# Patient Record
Sex: MALE | Race: WHITE | NOT HISPANIC OR LATINO | Employment: FULL TIME | URBAN - METROPOLITAN AREA
[De-identification: names, ages, dates, MRNs, and addresses within clinical notes are randomized per-mention and may not be internally consistent; named-entity substitution may affect disease eponyms.]

---

## 2023-10-16 ENCOUNTER — OFFICE VISIT (OUTPATIENT)
Age: 38
End: 2023-10-16
Payer: COMMERCIAL

## 2023-10-16 VITALS
DIASTOLIC BLOOD PRESSURE: 86 MMHG | HEART RATE: 63 BPM | BODY MASS INDEX: 33.7 KG/M2 | OXYGEN SATURATION: 99 % | HEIGHT: 70 IN | WEIGHT: 235.4 LBS | SYSTOLIC BLOOD PRESSURE: 136 MMHG

## 2023-10-16 DIAGNOSIS — Z00.00 HEALTHCARE MAINTENANCE: ICD-10-CM

## 2023-10-16 DIAGNOSIS — N41.1 CHRONIC PROSTATITIS: ICD-10-CM

## 2023-10-16 DIAGNOSIS — I10 ESSENTIAL HYPERTENSION: Primary | ICD-10-CM

## 2023-10-16 DIAGNOSIS — R35.89 POLYURIA: ICD-10-CM

## 2023-10-16 LAB
BILIRUB BLD-MCNC: NEGATIVE MG/DL
CLARITY, POC: CLEAR
COLOR UR: YELLOW
EXPIRATION DATE: NORMAL
GLUCOSE UR STRIP-MCNC: NEGATIVE MG/DL
KETONES UR QL: NEGATIVE
LEUKOCYTE EST, POC: NEGATIVE
Lab: NORMAL
NITRITE UR-MCNC: NEGATIVE MG/ML
PH UR: 6 [PH] (ref 5–8)
PROT UR STRIP-MCNC: NEGATIVE MG/DL
RBC # UR STRIP: NEGATIVE /UL
SP GR UR: 1 (ref 1–1.03)
UROBILINOGEN UR QL: NORMAL

## 2023-10-16 PROCEDURE — 81003 URINALYSIS AUTO W/O SCOPE: CPT | Performed by: STUDENT IN AN ORGANIZED HEALTH CARE EDUCATION/TRAINING PROGRAM

## 2023-10-16 PROCEDURE — 99204 OFFICE O/P NEW MOD 45 MIN: CPT | Performed by: STUDENT IN AN ORGANIZED HEALTH CARE EDUCATION/TRAINING PROGRAM

## 2023-10-16 RX ORDER — LEVOFLOXACIN 500 MG/1
500 TABLET, FILM COATED ORAL DAILY
Qty: 28 TABLET | Refills: 0 | Status: SHIPPED | OUTPATIENT
Start: 2023-10-16 | End: 2023-10-16 | Stop reason: SDUPTHER

## 2023-10-16 RX ORDER — LEVOFLOXACIN 500 MG/1
500 TABLET, FILM COATED ORAL DAILY
Qty: 28 TABLET | Refills: 0 | Status: SHIPPED | OUTPATIENT
Start: 2023-10-16 | End: 2023-11-13

## 2023-10-16 RX ORDER — TELMISARTAN 20 MG/1
20 TABLET ORAL DAILY
Qty: 90 TABLET | Refills: 3 | Status: SHIPPED | OUTPATIENT
Start: 2023-10-16

## 2023-10-16 NOTE — PROGRESS NOTES
"    Office Note     Name: Jay Burns    : 1985     MRN: [id number]     Chief Complaint  Urinary Frequency (Unable to full empty bladder. ) and Breathing Problem (\"Not breathing properly through nose\" )    Subjective     History of Present Illness:  Jay Burns is a 38 y.o. male who presents today for initial visit to establish care for chronic conditions and preventative health care.  Patient reports chronic urinary frequency, incomplete emptying, and difficulty emptying.  Denies any other infectious symptoms or burning.  Difficult to tell if this is polyuria or lower urinary tract symptoms, as he does note he has to drink plenty of water to keep up with his urine output.  He was reportedly diagnosed with chronic prostatitis by urologist a couple of years ago when he had similar symptoms, and these improved on an antibiotic.  He otherwise reports checking his blood pressure at home and having an elevated blood pressure in the 140s to 160s systolic.    Past Medical History: History reviewed. No pertinent past medical history.    Past Surgical History:   Past Surgical History:   Procedure Laterality Date    VASECTOMY  2018       Immunizations:   Immunization History   Administered Date(s) Administered    COVID-19 (PFIZER) Purple Cap Monovalent 2021, 2021        Medications:     Current Outpatient Medications:     levoFLOXacin (Levaquin) 500 MG tablet, Take 1 tablet by mouth Daily for 28 days., Disp: 28 tablet, Rfl: 0    telmisartan (MICARDIS) 20 MG tablet, Take 1 tablet by mouth Daily., Disp: 90 tablet, Rfl: 3    Allergies:   No Known Allergies    Family History: History reviewed. No pertinent family history.    Social History:   Social History     Socioeconomic History    Marital status:    Tobacco Use    Smoking status: Never     Passive exposure: Never    Smokeless tobacco: Never    Tobacco comments:     Never Used   Vaping Use    Vaping Use: Never used   Substance and " "Sexual Activity    Alcohol use: Never    Drug use: Never    Sexual activity: Yes     Partners: Female     Birth control/protection: Vasectomy         Objective     Vital Signs  /86   Pulse 63   Ht 177.8 cm (70\")   Wt 107 kg (235 lb 6.4 oz)   SpO2 99%   BMI 33.78 kg/m²   Estimated body mass index is 33.78 kg/m² as calculated from the following:    Height as of this encounter: 177.8 cm (70\").    Weight as of this encounter: 107 kg (235 lb 6.4 oz).    BMI is >= 30 and <35. (Class 1 Obesity). The following options were offered after discussion;: Not appropriate for this patient as his BMI is falsely elevated due to muscle mass      Physical Exam  Constitutional:       General: He is not in acute distress.     Appearance: He is not toxic-appearing.   Cardiovascular:      Rate and Rhythm: Normal rate and regular rhythm.      Heart sounds: No murmur heard.     No friction rub. No gallop.   Pulmonary:      Effort: Pulmonary effort is normal.      Breath sounds: Normal breath sounds.   Abdominal:      General: Abdomen is flat. There is no distension.   Skin:     General: Skin is warm and dry.   Neurological:      Mental Status: He is alert.   Psychiatric:         Mood and Affect: Mood normal.         Behavior: Behavior normal.          Assessment and Plan     1. Chronic prostatitis  Chronic, uncontrolled  -UA in office negative today, however patient was previously diagnosed by urologist with chronic prostatitis and the symptoms are very similar to that presentation  -We will empirically treat for prostatitis while awaiting urology follow-up, check PSA and refer to urology  - PSA DIAGNOSTIC; Future  - Ambulatory Referral to Urology  - levoFLOXacin (Levaquin) 500 MG tablet; Take 1 tablet by mouth Daily for 28 days.  Dispense: 28 tablet; Refill: 0    2. Polyuria  We will check A1c in case this represents polyuria from hyperglycemia  - Hemoglobin A1c; Future  - POCT urinalysis dipstick, automated    3. Healthcare " maintenance  Not fully addressed at this visit, no indication for early screening  - Comprehensive Metabolic Panel; Future  - Lipid Panel; Future    4. Essential hypertension  Blood pressure 136/86 today, consistently above 140 systolic at home  -Will start telmisartan and check labs in 2 weeks  - Comprehensive Metabolic Panel; Future  - Lipid Panel; Future  - Hemoglobin A1c; Future  - telmisartan (MICARDIS) 20 MG tablet; Take 1 tablet by mouth Daily.  Dispense: 90 tablet; Refill: 3        Counseling was given to patient for the following topics: instructions for management.    Follow Up  Return in about 6 weeks (around 11/27/2023).    [facility]  [address] [address] [phone]

## 2023-10-16 NOTE — PATIENT INSTRUCTIONS
Try nasal saline spray 2 times daily  Try flonase (fluticasone) nasal spray per package instructions during the winter time  Bloodwork in two weeks

## 2023-10-23 ENCOUNTER — LAB (OUTPATIENT)
Age: 38
End: 2023-10-23
Payer: COMMERCIAL

## 2023-10-23 DIAGNOSIS — Z00.00 ROUTINE GENERAL MEDICAL EXAMINATION AT A HEALTH CARE FACILITY: Primary | ICD-10-CM

## 2023-10-23 DIAGNOSIS — N41.1 CHRONIC PROSTATITIS: ICD-10-CM

## 2023-10-23 DIAGNOSIS — I10 ESSENTIAL HYPERTENSION: ICD-10-CM

## 2023-10-23 DIAGNOSIS — R35.89 POLYURIA: ICD-10-CM

## 2023-10-23 DIAGNOSIS — Z00.00 HEALTHCARE MAINTENANCE: ICD-10-CM

## 2023-10-23 DIAGNOSIS — I10 ESSENTIAL HYPERTENSION, MALIGNANT: ICD-10-CM

## 2023-10-23 PROCEDURE — 84153 ASSAY OF PSA TOTAL: CPT | Performed by: STUDENT IN AN ORGANIZED HEALTH CARE EDUCATION/TRAINING PROGRAM

## 2023-10-23 PROCEDURE — 80053 COMPREHEN METABOLIC PANEL: CPT | Performed by: STUDENT IN AN ORGANIZED HEALTH CARE EDUCATION/TRAINING PROGRAM

## 2023-10-23 PROCEDURE — 80061 LIPID PANEL: CPT | Performed by: STUDENT IN AN ORGANIZED HEALTH CARE EDUCATION/TRAINING PROGRAM

## 2023-10-23 PROCEDURE — 83036 HEMOGLOBIN GLYCOSYLATED A1C: CPT | Performed by: STUDENT IN AN ORGANIZED HEALTH CARE EDUCATION/TRAINING PROGRAM

## 2023-10-24 LAB
ALBUMIN SERPL-MCNC: 4.9 G/DL (ref 3.5–5.2)
ALBUMIN/GLOB SERPL: 1.9 G/DL
ALP SERPL-CCNC: 68 U/L (ref 39–117)
ALT SERPL W P-5'-P-CCNC: 28 U/L (ref 1–41)
ANION GAP SERPL CALCULATED.3IONS-SCNC: 7.1 MMOL/L (ref 5–15)
AST SERPL-CCNC: 22 U/L (ref 1–40)
BILIRUB SERPL-MCNC: 0.7 MG/DL (ref 0–1.2)
BUN SERPL-MCNC: 18 MG/DL (ref 6–20)
BUN/CREAT SERPL: 14.3 (ref 7–25)
CALCIUM SPEC-SCNC: 9.1 MG/DL (ref 8.6–10.5)
CHLORIDE SERPL-SCNC: 104 MMOL/L (ref 98–107)
CHOLEST SERPL-MCNC: 170 MG/DL (ref 0–200)
CO2 SERPL-SCNC: 26.9 MMOL/L (ref 22–29)
CREAT SERPL-MCNC: 1.26 MG/DL (ref 0.76–1.27)
EGFRCR SERPLBLD CKD-EPI 2021: 75.8 ML/MIN/1.73
GLOBULIN UR ELPH-MCNC: 2.6 GM/DL
GLUCOSE SERPL-MCNC: 88 MG/DL (ref 65–99)
HBA1C MFR BLD: 4.9 % (ref 4.8–5.6)
HDLC SERPL-MCNC: 37 MG/DL (ref 40–60)
LDLC SERPL CALC-MCNC: 121 MG/DL (ref 0–100)
LDLC/HDLC SERPL: 3.27 {RATIO}
POTASSIUM SERPL-SCNC: 4.5 MMOL/L (ref 3.5–5.2)
PROT SERPL-MCNC: 7.5 G/DL (ref 6–8.5)
PSA SERPL-MCNC: 0.57 NG/ML (ref 0–4)
SODIUM SERPL-SCNC: 138 MMOL/L (ref 136–145)
TRIGL SERPL-MCNC: 60 MG/DL (ref 0–150)
VLDLC SERPL-MCNC: 12 MG/DL (ref 5–40)

## 2023-10-25 ENCOUNTER — PATIENT MESSAGE (OUTPATIENT)
Age: 38
End: 2023-10-25
Payer: COMMERCIAL

## 2023-10-25 DIAGNOSIS — N13.9 LOWER URINARY OBSTRUCTIVE SYMPTOM: Primary | ICD-10-CM

## 2023-10-26 ENCOUNTER — TELEPHONE (OUTPATIENT)
Age: 38
End: 2023-10-26
Payer: COMMERCIAL

## 2023-10-26 NOTE — TELEPHONE ENCOUNTER
Patient was returning call but wasn't sure who was calling. He is able to urinate no problem but immediately feels like he could go ahead after he has gone. It is a constant feeling all day. He is working until 5 so he can't answer a call but is able to talk over [employer] message.

## 2023-10-31 ENCOUNTER — TELEPHONE (OUTPATIENT)
Age: 38
End: 2023-10-31
Payer: COMMERCIAL

## 2023-10-31 NOTE — TELEPHONE ENCOUNTER
PT CALLED IN CHECKING STATUS OF HIS REFERRAL. HUB SEES THAT ITS PENDING, PT THOUGHT IT WAS STAT. HE WOULD LIKE A CALL BACK.

## 2023-10-31 NOTE — TELEPHONE ENCOUNTER
Called patient and provided number for Central Scheduling to schedule. Advised it was placed as routine, not stat.

## 2023-11-15 ENCOUNTER — HOSPITAL ENCOUNTER (OUTPATIENT)
Dept: ULTRASOUND IMAGING | Facility: HOSPITAL | Age: 38
Discharge: HOME | End: 2023-11-15
Admitting: STUDENT IN AN ORGANIZED HEALTH CARE EDUCATION/TRAINING PROGRAM
Payer: COMMERCIAL

## 2023-11-15 DIAGNOSIS — N13.9 LOWER URINARY OBSTRUCTIVE SYMPTOM: ICD-10-CM

## 2023-11-15 PROCEDURE — 76775 US EXAM ABDO BACK WALL LIM: CPT

## 2023-11-27 ENCOUNTER — OFFICE VISIT (OUTPATIENT)
Age: 38
End: 2023-11-27
Payer: COMMERCIAL

## 2023-11-27 ENCOUNTER — LAB (OUTPATIENT)
Age: 38
End: 2023-11-27
Payer: COMMERCIAL

## 2023-11-27 VITALS
SYSTOLIC BLOOD PRESSURE: 136 MMHG | HEART RATE: 69 BPM | BODY MASS INDEX: 34.58 KG/M2 | WEIGHT: 241 LBS | DIASTOLIC BLOOD PRESSURE: 78 MMHG | OXYGEN SATURATION: 100 % | TEMPERATURE: 98.7 F

## 2023-11-27 DIAGNOSIS — I10 ESSENTIAL HYPERTENSION: Primary | ICD-10-CM

## 2023-11-27 DIAGNOSIS — N13.9 LOWER URINARY OBSTRUCTIVE SYMPTOM: ICD-10-CM

## 2023-11-27 DIAGNOSIS — I10 ESSENTIAL HYPERTENSION: ICD-10-CM

## 2023-11-27 PROCEDURE — 80048 BASIC METABOLIC PNL TOTAL CA: CPT | Performed by: STUDENT IN AN ORGANIZED HEALTH CARE EDUCATION/TRAINING PROGRAM

## 2023-11-27 PROCEDURE — 99214 OFFICE O/P EST MOD 30 MIN: CPT | Performed by: STUDENT IN AN ORGANIZED HEALTH CARE EDUCATION/TRAINING PROGRAM

## 2023-11-27 NOTE — PROGRESS NOTES
Office Note     Name: Jay Burns    : 1985     MRN: [id number]     Chief Complaint  Hypertension and Polyuria    Subjective     History of Present Illness:  Jay Burns is a 38 y.o. male who presents today for follow up of hypertension and urinary frequency. Antibiotic course was only minimally helpful and he has not yet started flomax.  He has been checking his bp at home which has consistently been in the 120's systolic.      Past Medical History: History reviewed. No pertinent past medical history.    Past Surgical History:   Past Surgical History:   Procedure Laterality Date    VASECTOMY  2018       Immunizations:   Immunization History   Administered Date(s) Administered    COVID-19 (PFIZER) Purple Cap Monovalent 2021, 2021    Hep B, Adolescent or Pediatric 1998    Hepatitis B Adolescent High Risk Infant 1998, 1998    MMR 1998    Td (TDVAX) 2003        Medications:     Current Outpatient Medications:     telmisartan (MICARDIS) 20 MG tablet, Take 1 tablet by mouth Daily., Disp: 90 tablet, Rfl: 3    tamsulosin (FLOMAX) 0.4 MG capsule 24 hr capsule, Take 1 capsule by mouth Daily. (Patient not taking: Reported on 2023), Disp: 30 capsule, Rfl: 5    Allergies:   No Known Allergies    Family History: History reviewed. No pertinent family history.    Social History:   Social History     Socioeconomic History    Marital status:    Tobacco Use    Smoking status: Never     Passive exposure: Never    Smokeless tobacco: Never    Tobacco comments:     Never Used   Vaping Use    Vaping Use: Never used   Substance and Sexual Activity    Alcohol use: Never    Drug use: Never    Sexual activity: Yes     Partners: Female     Birth control/protection: Vasectomy         Objective     Vital Signs  /78   Pulse 69   Temp 98.7 °F (37.1 °C)   Wt 109 kg (241 lb)   SpO2 100%   BMI 34.58 kg/m²   Estimated body mass index is 34.58 kg/m² as  "calculated from the following:    Height as of 10/16/23: 177.8 cm (70\").    Weight as of this encounter: 109 kg (241 lb).            Physical Exam  Constitutional:       General: He is not in acute distress.     Appearance: He is not toxic-appearing.   Pulmonary:      Effort: Pulmonary effort is normal. No respiratory distress.   Abdominal:      General: Abdomen is flat. There is no distension.   Skin:     General: Skin is warm and dry.   Neurological:      Mental Status: He is alert.   Psychiatric:         Mood and Affect: Mood normal.         Behavior: Behavior normal.          Assessment and Plan     1. Essential hypertension  Chronic stable  -BP is at goal at home on current dose of telmisartan, continue at current dose and check BMP  - Basic metabolic panel; Future    2. Lower urinary obstructive symptom  Chronic, uncontrolled  -Hasn't started flomax yet, counseled to try this  -Counseled to call and make his urology appt if not improving on flomax  -Antibiotics did not significantly improve these symptoms       Counseling was given to patient for the following topics: instructions for management.    Follow Up  Return in about 6 months (around 5/27/2024) for Annual physical.    Jose De Jesus Cuellar MD  [facility]  [address] [address] [phone]  "

## 2023-11-28 LAB
ANION GAP SERPL CALCULATED.3IONS-SCNC: 12.2 MMOL/L (ref 5–15)
BUN SERPL-MCNC: 13 MG/DL (ref 6–20)
BUN/CREAT SERPL: 12.4 (ref 7–25)
CALCIUM SPEC-SCNC: 9.6 MG/DL (ref 8.6–10.5)
CHLORIDE SERPL-SCNC: 100 MMOL/L (ref 98–107)
CO2 SERPL-SCNC: 25.8 MMOL/L (ref 22–29)
CREAT SERPL-MCNC: 1.05 MG/DL (ref 0.76–1.27)
EGFRCR SERPLBLD CKD-EPI 2021: 94.3 ML/MIN/1.73
GLUCOSE SERPL-MCNC: 88 MG/DL (ref 65–99)
POTASSIUM SERPL-SCNC: 4.2 MMOL/L (ref 3.5–5.2)
SODIUM SERPL-SCNC: 138 MMOL/L (ref 136–145)

## 2023-12-05 ENCOUNTER — OFFICE VISIT (OUTPATIENT)
Age: 38
End: 2023-12-05
Payer: COMMERCIAL

## 2023-12-05 VITALS
SYSTOLIC BLOOD PRESSURE: 122 MMHG | BODY MASS INDEX: 33.79 KG/M2 | DIASTOLIC BLOOD PRESSURE: 78 MMHG | OXYGEN SATURATION: 100 % | WEIGHT: 236 LBS | HEIGHT: 70 IN | TEMPERATURE: 98.9 F | HEART RATE: 96 BPM

## 2023-12-05 DIAGNOSIS — R52 BODY ACHES: ICD-10-CM

## 2023-12-05 DIAGNOSIS — U07.1 COVID-19 VIRUS INFECTION: Primary | ICD-10-CM

## 2023-12-05 DIAGNOSIS — R09.81 NASAL CONGESTION: ICD-10-CM

## 2023-12-05 LAB
EXPIRATION DATE: ABNORMAL
FLUAV AG UPPER RESP QL IA.RAPID: NOT DETECTED
FLUBV AG UPPER RESP QL IA.RAPID: NOT DETECTED
INTERNAL CONTROL: ABNORMAL
Lab: ABNORMAL
SARS-COV-2 AG UPPER RESP QL IA.RAPID: DETECTED

## 2023-12-05 PROCEDURE — 99213 OFFICE O/P EST LOW 20 MIN: CPT | Performed by: STUDENT IN AN ORGANIZED HEALTH CARE EDUCATION/TRAINING PROGRAM

## 2023-12-05 PROCEDURE — 87428 SARSCOV & INF VIR A&B AG IA: CPT | Performed by: STUDENT IN AN ORGANIZED HEALTH CARE EDUCATION/TRAINING PROGRAM

## 2023-12-05 RX ORDER — BENZONATATE 200 MG/1
200 CAPSULE ORAL 3 TIMES DAILY PRN
Qty: 30 CAPSULE | Refills: 0 | Status: SHIPPED | OUTPATIENT
Start: 2023-12-05 | End: 2023-12-15

## 2023-12-05 RX ORDER — GUAIFENESIN 600 MG/1
1200 TABLET, EXTENDED RELEASE ORAL 2 TIMES DAILY
Qty: 30 TABLET | Refills: 0 | Status: SHIPPED | OUTPATIENT
Start: 2023-12-05 | End: 2023-12-15

## 2023-12-05 NOTE — LETTER
December 5, 2023     Patient: Jay Burns   YOB: 1985   Date of Visit: 12/5/2023       To Whom It May Concern:    It is my medical opinion that Jay Burns has COVID-19 and should miss work from 12/4-12/8/2023 and return with a mask through 12/13/2023.         Sincerely,        Cisco Cuellar MD    CC: No Recipients

## 2023-12-05 NOTE — PROGRESS NOTES
Office Note     Name: Jay Burns    : 1985     MRN: [id number]     Chief Complaint  Nasal Congestion (Pt states this started yesterday. ), Shortness of Breath, and Generalized Body Aches    Subjective     History of Present Illness:  Jay Burns is a 38 y.o. male who presents today for acute visit for congestion, muscle aches, fever.  His wife had COVID recently and his symptoms began on .  He is not dyspneic but is congested    Past Medical History: History reviewed. No pertinent past medical history.    Past Surgical History:   Past Surgical History:   Procedure Laterality Date    VASECTOMY  2018       Immunizations:   Immunization History   Administered Date(s) Administered    COVID-19 (PFIZER) Purple Cap Monovalent 2021, 2021    Hep B, Adolescent or Pediatric 1998    Hepatitis B Adolescent High Risk Infant 1998, 1998    MMR 1998    Td (TDVAX) 2003        Medications:     Current Outpatient Medications:     telmisartan (MICARDIS) 20 MG tablet, Take 1 tablet by mouth Daily., Disp: 90 tablet, Rfl: 3    benzonatate (TESSALON) 200 MG capsule, Take 1 capsule by mouth 3 (Three) Times a Day As Needed for Cough for up to 10 days., Disp: 30 capsule, Rfl: 0    guaiFENesin (MUCINEX) 600 MG 12 hr tablet, Take 2 tablets by mouth 2 (Two) Times a Day for 10 days., Disp: 30 tablet, Rfl: 0    Allergies:   No Known Allergies    Family History: History reviewed. No pertinent family history.    Social History:   Social History     Socioeconomic History    Marital status:    Tobacco Use    Smoking status: Never     Passive exposure: Never    Smokeless tobacco: Never    Tobacco comments:     Never Used   Vaping Use    Vaping Use: Never used   Substance and Sexual Activity    Alcohol use: Never    Drug use: Never    Sexual activity: Yes     Partners: Female     Birth control/protection: Vasectomy         Objective     Vital Signs  /78    "Pulse 96   Temp 98.9 °F (37.2 °C)   Ht 177.8 cm (70\")   Wt 107 kg (236 lb)   SpO2 100%   BMI 33.86 kg/m²   Estimated body mass index is 33.86 kg/m² as calculated from the following:    Height as of this encounter: 177.8 cm (70\").    Weight as of this encounter: 107 kg (236 lb).            Physical Exam  Constitutional:       General: He is not in acute distress.     Appearance: He is not toxic-appearing.   Pulmonary:      Effort: Pulmonary effort is normal. No respiratory distress.   Abdominal:      General: Abdomen is flat. There is no distension.   Skin:     General: Skin is warm and dry.   Neurological:      Mental Status: He is alert.   Psychiatric:         Mood and Affect: Mood normal.         Behavior: Behavior normal.          Assessment and Plan     1. Body aches  See problem #3  - POCT SARS-CoV-2 + Flu Antigen ROSMERY    2. Nasal congestion  See problem #3  - POCT SARS-CoV-2 + Flu Antigen ROSMERY    3. COVID-19 virus infection  In office COVID test positive  -Patient has high blood pressure so we will prescribe Tessalon Perles and Mucinex  -Discussed Paxlovid with him, he declines this at this time  -Counseled him on return precautions for dyspnea especially if associated with a low oxygen saturation.  Counseled him on isolation for 5 days with mask isolation for 5 days following that  - guaiFENesin (MUCINEX) 600 MG 12 hr tablet; Take 2 tablets by mouth 2 (Two) Times a Day for 10 days.  Dispense: 30 tablet; Refill: 0  - benzonatate (TESSALON) 200 MG capsule; Take 1 capsule by mouth 3 (Three) Times a Day As Needed for Cough for up to 10 days.  Dispense: 30 capsule; Refill: 0       Counseling was given to patient for the following topics: instructions for management.    Follow Up  No follow-ups on file.    [facility]  [address] [address]  [phone]  "

## 2024-05-31 ENCOUNTER — OFFICE VISIT (OUTPATIENT)
Age: 39
End: 2024-05-31

## 2024-05-31 VITALS
OXYGEN SATURATION: 97 % | SYSTOLIC BLOOD PRESSURE: 126 MMHG | TEMPERATURE: 97.5 F | DIASTOLIC BLOOD PRESSURE: 78 MMHG | WEIGHT: 236 LBS | BODY MASS INDEX: 33.79 KG/M2 | HEIGHT: 70 IN | HEART RATE: 79 BPM

## 2024-05-31 DIAGNOSIS — I10 ESSENTIAL HYPERTENSION: ICD-10-CM

## 2024-05-31 DIAGNOSIS — N40.1 BENIGN PROSTATIC HYPERPLASIA WITH LOWER URINARY TRACT SYMPTOMS, SYMPTOM DETAILS UNSPECIFIED: ICD-10-CM

## 2024-05-31 DIAGNOSIS — Z00.00 HEALTHCARE MAINTENANCE: Primary | ICD-10-CM

## 2024-05-31 RX ORDER — FINASTERIDE 5 MG/1
5 TABLET, FILM COATED ORAL DAILY
Qty: 90 TABLET | Refills: 3 | Status: SHIPPED | OUTPATIENT
Start: 2024-05-31

## 2024-05-31 NOTE — PROGRESS NOTES
Office Note     Name: Jay Burns    : 1985     MRN: [id number]     Chief Complaint  Hypertension and Annual Exam    Subjective     History of Present Illness:  Jay Burns is a 38 y.o. male who presents today for annual health maintenance examination.  He has no acute complaints today, although his tamsulosin has had a bothersome side effect of nasal congestion.  His blood pressures at home have been in the 120s over 70s on a regular basis.  He is attempting to lose some weight right now and has lost around 10 pounds per his report.  He did notice that the tamsulosin has helped with his lower urinary tract symptoms.  He reports taking testosterone for several years at a younger age but is no longer on any supplementation like this.    Past Medical History: History reviewed. No pertinent past medical history.    Past Surgical History:   Past Surgical History:   Procedure Laterality Date    VASECTOMY  2018       Immunizations:   Immunization History   Administered Date(s) Administered    COVID-19 (PFIZER) Purple Cap Monovalent 2021, 2021    Hep B, Adolescent or Pediatric 1998    Hepatitis B Adolescent High Risk Infant 1998, 1998    MMR 1998    Td (TDVAX) 2003        Medications:     Current Outpatient Medications:     telmisartan (MICARDIS) 20 MG tablet, Take 1 tablet by mouth Daily., Disp: 90 tablet, Rfl: 3    finasteride (PROSCAR) 5 MG tablet, Take 1 tablet by mouth Daily., Disp: 90 tablet, Rfl: 3    Allergies:   No Known Allergies    Family History: History reviewed. No pertinent family history.    Social History:   Social History     Socioeconomic History    Marital status:    Tobacco Use    Smoking status: Never     Passive exposure: Never    Smokeless tobacco: Never    Tobacco comments:     Never Used   Vaping Use    Vaping status: Never Used   Substance and Sexual Activity    Alcohol use: Never    Drug use: Never    Sexual activity:  "Yes     Partners: Female     Birth control/protection: Vasectomy         Objective     Vital Signs  /78   Pulse 79   Temp 97.5 °F (36.4 °C)   Ht 177.8 cm (70\")   Wt 107 kg (236 lb)   SpO2 97%   BMI 33.86 kg/m²   Estimated body mass index is 33.86 kg/m² as calculated from the following:    Height as of this encounter: 177.8 cm (70\").    Weight as of this encounter: 107 kg (236 lb).            Physical Exam  Constitutional:       General: He is not in acute distress.     Appearance: He is not toxic-appearing.   Cardiovascular:      Rate and Rhythm: Normal rate and regular rhythm.      Heart sounds: No murmur heard.     No friction rub. No gallop.   Pulmonary:      Effort: Pulmonary effort is normal.      Breath sounds: Normal breath sounds.   Abdominal:      General: Abdomen is flat. There is no distension.   Skin:     General: Skin is warm and dry.   Neurological:      Mental Status: He is alert.   Psychiatric:         Mood and Affect: Mood normal.         Behavior: Behavior normal.          Assessment and Plan     1. Essential hypertension  Chronic stable  BP at goal  Continue telmisartan, patient trying to lose weight and cut back on salt.  If possible he wants to do a trial off of the telmisartan in the future, goal would be home weight of 210 before trying this  -Continue telmisartan at current dose for now    2. Benign prostatic hyperplasia with lower urinary tract symptoms, symptom details unspecified  Chronic stable  -Switch tamsulosin to finasteride due to intolerance of tamsulosin  - finasteride (PROSCAR) 5 MG tablet; Take 1 tablet by mouth Daily.  Dispense: 90 tablet; Refill: 3    3. Healthcare maintenance  #HCM  Cancer screening  -Colon Cancer: no indication for early screen  -Lung cancer: LDCT Not indicated  -Prostate cancer: PSA checked in October, symptoms stable since then but starting proscar  Labs utd  Depression  -PHQ-2 Depression Screening  Little interest or pleasure in doing things? " 0-->not at all   Feeling down, depressed, or hopeless? 0-->not at all   PHQ-2 Total Score 0     Infections not indicated    Immunizations  Immunization History   Administered Date(s) Administered    COVID-19 (PFIZER) Purple Cap Monovalent 09/04/2021, 09/25/2021    Hep B, Adolescent or Pediatric 05/04/1998    Hepatitis B Adolescent High Risk Infant 07/22/1998, 12/23/1998    MMR 05/04/1998    Td (TDVAX) 07/23/2003           Counseling was given to patient for the following topics: instructions for management.    Follow Up  Return in about 5 months (around 10/31/2024) for Follow Up.    Jose De Jesus Cuellar MD  [facility]  [address] [address] [phone]

## 2024-10-10 DIAGNOSIS — I10 ESSENTIAL HYPERTENSION: ICD-10-CM

## 2024-10-10 RX ORDER — TELMISARTAN 20 MG/1
20 TABLET ORAL DAILY
Qty: 90 TABLET | Refills: 3 | Status: SHIPPED | OUTPATIENT
Start: 2024-10-10

## 2024-10-10 NOTE — TELEPHONE ENCOUNTER
Rx Refill Note  Requested Prescriptions     Pending Prescriptions Disp Refills    telmisartan (MICARDIS) 20 MG tablet [Pharmacy Med Name: TELMISARTAN 20 MG TABLET] 90 tablet 3     Sig: TAKE 1 TABLET BY MOUTH DAILY      Last office visit with prescribing clinician: 5/31/2024   Last telemedicine visit with prescribing clinician: Visit date not found   Next office visit with prescribing clinician: 11/1/2024     Guillermina Shook Rep  10/10/24, 07:43 EDT    Rx sent

## 2024-11-01 ENCOUNTER — OFFICE VISIT (OUTPATIENT)
Age: 39
End: 2024-11-01

## 2024-11-01 VITALS
OXYGEN SATURATION: 98 % | SYSTOLIC BLOOD PRESSURE: 112 MMHG | DIASTOLIC BLOOD PRESSURE: 74 MMHG | RESPIRATION RATE: 18 BRPM | WEIGHT: 233.2 LBS | HEIGHT: 70 IN | BODY MASS INDEX: 33.39 KG/M2 | HEART RATE: 67 BPM

## 2024-11-01 DIAGNOSIS — R73.9 HYPERGLYCEMIA: ICD-10-CM

## 2024-11-01 DIAGNOSIS — E78.2 MIXED HYPERLIPIDEMIA: ICD-10-CM

## 2024-11-01 DIAGNOSIS — N13.9 LOWER URINARY OBSTRUCTIVE SYMPTOM: ICD-10-CM

## 2024-11-01 DIAGNOSIS — I10 ESSENTIAL HYPERTENSION: Primary | ICD-10-CM

## 2024-11-01 NOTE — PROGRESS NOTES
"    Office Note     Name: Jay Burns    : 1985     MRN: [id number]     Chief Complaint  Follow-up (Pt states he is here for 6m f/u he states nothing is going on. )    Subjective     History of Present Illness:  Jay Burns is a 39 y.o. male who presents today for follow up of chronic conditions. Doing well on proscar and telmisartan. No concerns.      Past Medical History:   Past Medical History:   Diagnosis Date    Hypertension        Past Surgical History:   Past Surgical History:   Procedure Laterality Date    VASECTOMY  2018       Immunizations:   Immunization History   Administered Date(s) Administered    COVID-19 (PFIZER) Purple Cap Monovalent 2021, 2021    Hep B, Adolescent or Pediatric 1998    Hepatitis B Adolescent High Risk Infant 1998, 1998    MMR 1998    Td (TDVAX) 2003        Medications:     Current Outpatient Medications:     finasteride (PROSCAR) 5 MG tablet, Take 1 tablet by mouth Daily., Disp: 90 tablet, Rfl: 3    telmisartan (MICARDIS) 20 MG tablet, TAKE 1 TABLET BY MOUTH DAILY, Disp: 90 tablet, Rfl: 3    Allergies:   No Known Allergies    Family History: History reviewed. No pertinent family history.    Social History:   Social History     Socioeconomic History    Marital status:    Tobacco Use    Smoking status: Never     Passive exposure: Never    Smokeless tobacco: Never    Tobacco comments:     Never Used   Vaping Use    Vaping status: Never Used   Substance and Sexual Activity    Alcohol use: Never    Drug use: Never    Sexual activity: Yes     Partners: Female     Birth control/protection: Vasectomy         Objective     Vital Signs  /74 (BP Location: Left arm, Patient Position: Sitting, Cuff Size: Large Adult)   Pulse 67   Resp 18   Ht 177.8 cm (70\")   Wt 106 kg (233 lb 3.2 oz)   SpO2 98%   BMI 33.46 kg/m²   Estimated body mass index is 33.46 kg/m² as calculated from the following:    Height as of this " "encounter: 177.8 cm (70\").    Weight as of this encounter: 106 kg (233 lb 3.2 oz).    BMI is >= 30 and <35. (Class 1 Obesity). The following options were offered after discussion;: patient BMI falsely elevated due to muscle mass      Physical Exam  Constitutional:       General: He is not in acute distress.     Appearance: He is not toxic-appearing.   Cardiovascular:      Rate and Rhythm: Normal rate and regular rhythm.      Heart sounds: No murmur heard.     No friction rub. No gallop.   Pulmonary:      Effort: Pulmonary effort is normal.      Breath sounds: Normal breath sounds.   Abdominal:      General: Abdomen is flat. There is no distension.   Skin:     General: Skin is warm and dry.   Neurological:      Mental Status: He is alert.   Psychiatric:         Mood and Affect: Mood normal.         Behavior: Behavior normal.          Assessment and Plan     1. Essential hypertension  Chronic stable, tolerating telmisartan well  -Check labs and continue telmisartan at current dose  - Comprehensive Metabolic Panel; Future    2. Lower urinary obstructive symptom  Chronic stable  -Check PSA and continue proscar  - PSA DIAGNOSTIC; Future    3. Mixed hyperlipidemia  Check lipids  - Lipid Panel; Future    4. Hyperglycemia  Check A1c  - Hemoglobin A1c; Future       Counseling was given to patient for the following topics: instructions for management.    Follow Up  Return in about 1 year (around 11/1/2025) for Annual physical.    MD MODESTA [facility]  [address] [address]  [phone]  "

## 2025-04-30 ENCOUNTER — TELEPHONE (OUTPATIENT)
Age: 40
End: 2025-04-30

## 2025-04-30 DIAGNOSIS — D22.9 ATYPICAL MOLE: Primary | ICD-10-CM

## 2025-04-30 NOTE — TELEPHONE ENCOUNTER
"Caller: Jay Burns \"Max\"    Relationship: Self    Best call back number: [phone]    What is the medical concern/diagnosis: MOLES ON BACK    What specialty or service is being requested: DERMATOLOGY    Any additional details: PATIENT WOULD LIKE RECOMMENDATIONS ON DERMATOLOGIST. NOT SURE IF HE NEEDS REFERRAL OR NOT. PLEASE ADVISE        "

## 2025-06-09 DIAGNOSIS — N40.1 BENIGN PROSTATIC HYPERPLASIA WITH LOWER URINARY TRACT SYMPTOMS, SYMPTOM DETAILS UNSPECIFIED: ICD-10-CM

## 2025-06-09 RX ORDER — FINASTERIDE 5 MG/1
5 TABLET, FILM COATED ORAL DAILY
Qty: 90 TABLET | Refills: 3 | Status: SHIPPED | OUTPATIENT
Start: 2025-06-09